# Patient Record
Sex: MALE | Race: WHITE | HISPANIC OR LATINO | Employment: FULL TIME | URBAN - METROPOLITAN AREA
[De-identification: names, ages, dates, MRNs, and addresses within clinical notes are randomized per-mention and may not be internally consistent; named-entity substitution may affect disease eponyms.]

---

## 2023-10-15 ENCOUNTER — HOSPITAL ENCOUNTER (EMERGENCY)
Facility: HOSPITAL | Age: 58
Discharge: HOME | End: 2023-10-15
Attending: EMERGENCY MEDICINE | Admitting: EMERGENCY MEDICINE
Payer: COMMERCIAL

## 2023-10-15 ENCOUNTER — APPOINTMENT (OUTPATIENT)
Dept: GENERAL RADIOLOGY | Facility: HOSPITAL | Age: 58
End: 2023-10-15
Payer: COMMERCIAL

## 2023-10-15 VITALS
BODY MASS INDEX: 31.18 KG/M2 | HEART RATE: 98 BPM | RESPIRATION RATE: 18 BRPM | OXYGEN SATURATION: 98 % | TEMPERATURE: 98.1 F | WEIGHT: 187.17 LBS | SYSTOLIC BLOOD PRESSURE: 168 MMHG | DIASTOLIC BLOOD PRESSURE: 82 MMHG | HEIGHT: 65 IN

## 2023-10-15 DIAGNOSIS — M79.644 FINGER PAIN, RIGHT: ICD-10-CM

## 2023-10-15 DIAGNOSIS — S61.210A LACERATION OF RIGHT INDEX FINGER WITHOUT FOREIGN BODY WITHOUT DAMAGE TO NAIL, INITIAL ENCOUNTER: Primary | ICD-10-CM

## 2023-10-15 PROCEDURE — 73140 X-RAY EXAM OF FINGER(S): CPT

## 2023-10-15 PROCEDURE — 99282 EMERGENCY DEPT VISIT SF MDM: CPT

## 2023-10-15 PROCEDURE — 90471 IMMUNIZATION ADMIN: CPT

## 2023-10-15 PROCEDURE — 25010000002 TETANUS-DIPHTH-ACELL PERTUSSIS 5-2.5-18.5 LF-MCG/0.5 SUSPENSION PREFILLED SYRINGE

## 2023-10-15 PROCEDURE — 25010000002 BUPIVACAINE (PF) 0.5 % SOLUTION

## 2023-10-15 PROCEDURE — 90715 TDAP VACCINE 7 YRS/> IM: CPT

## 2023-10-15 RX ORDER — BUPIVACAINE HYDROCHLORIDE 5 MG/ML
10 INJECTION, SOLUTION EPIDURAL; INTRACAUDAL ONCE
Status: COMPLETED | OUTPATIENT
Start: 2023-10-15 | End: 2023-10-15

## 2023-10-15 RX ADMIN — TETANUS TOXOID, REDUCED DIPHTHERIA TOXOID AND ACELLULAR PERTUSSIS VACCINE, ADSORBED 0.5 ML: 5; 2.5; 8; 8; 2.5 SUSPENSION INTRAMUSCULAR at 14:01

## 2023-10-15 RX ADMIN — BUPIVACAINE HYDROCHLORIDE 10 ML: 5 INJECTION, SOLUTION EPIDURAL; INTRACAUDAL; PERINEURAL at 13:28

## 2023-10-15 RX ADMIN — MUPIROCIN 1 APPLICATION: 20 OINTMENT TOPICAL at 14:46

## 2023-10-15 NOTE — ED PROVIDER NOTES
Subjective   History of Present Illness  58-year-old male presents to the ED with laceration to right index finger onset approximately 2 hours ago.  He states he was at work when he smashed his finger between 2 pieces of wood.  Notes that he is able to still move his finger and has sensation.  Denies any other injury.  Unsure of last tetanus.        Review of Systems   Respiratory:  Negative for chest tightness and shortness of breath.    Cardiovascular:  Negative for chest pain and palpitations.   Skin:  Positive for wound.   All other systems reviewed and are negative.      No past medical history on file.    No Known Allergies    No past surgical history on file.    No family history on file.    Social History     Socioeconomic History    Marital status:            Objective   Physical Exam  Vitals and nursing note reviewed.   Constitutional:       General: He is not in acute distress.     Appearance: Normal appearance. He is not ill-appearing, toxic-appearing or diaphoretic.   HENT:      Head: Normocephalic and atraumatic.      Nose: Nose normal. No congestion or rhinorrhea.      Mouth/Throat:      Pharynx: Oropharynx is clear. No oropharyngeal exudate or posterior oropharyngeal erythema.   Eyes:      General: No scleral icterus.     Extraocular Movements: Extraocular movements intact.      Conjunctiva/sclera: Conjunctivae normal.      Pupils: Pupils are equal, round, and reactive to light.   Cardiovascular:      Rate and Rhythm: Regular rhythm. Tachycardia present.      Heart sounds: Normal heart sounds. No murmur heard.     No friction rub. No gallop.   Pulmonary:      Effort: Pulmonary effort is normal. No respiratory distress.      Breath sounds: Normal breath sounds. No stridor. No wheezing, rhonchi or rales.   Chest:      Chest wall: No tenderness.   Musculoskeletal:      Right hand: Laceration present. Normal strength. Normal capillary refill. Normal pulse.      Left hand: Normal strength. Normal  capillary refill. Normal pulse.        Hands:       Cervical back: Normal range of motion and neck supple.      Comments: Right index finger with linear laceration to dorsolateral aspect,  less than 1cm in length. Skin tear noted to dorsal aspect of R index finger below fingernail. Neurovascularly intact.   Skin:     General: Skin is warm and dry.   Neurological:      General: No focal deficit present.      Mental Status: He is alert and oriented to person, place, and time. Mental status is at baseline.   Psychiatric:         Mood and Affect: Mood normal.         Behavior: Behavior normal.         Thought Content: Thought content normal.         Judgment: Judgment normal.         Laceration Repair    Date/Time: 10/15/2023 2:38 PM    Performed by: Terra Lawrence PA-C  Authorized by: Jin Pineda MD    Consent:     Consent obtained:  Verbal    Consent given by:  Patient    Risks, benefits, and alternatives were discussed: yes      Risks discussed:  Infection, pain and poor wound healing  Universal protocol:     Procedure explained and questions answered to patient or proxy's satisfaction: yes      Relevant documents present and verified: yes      Imaging studies available: yes      Site/side marked: yes      Immediately prior to procedure, a time out was called: yes      Patient identity confirmed:  Verbally with patient, arm band and hospital-assigned identification number  Anesthesia:     Anesthesia method:  Nerve block    Block location:  R index finger digit block    Block needle gauge:  24 G    Block anesthetic:  Bupivacaine 0.5% w/o epi    Block technique:  Digit    Block injection procedure:  Anatomic landmarks identified    Block outcome:  Anesthesia achieved  Laceration details:     Location:  Finger    Finger location:  R index finger    Length (cm):  1  Pre-procedure details:     Preparation:  Patient was prepped and draped in usual sterile fashion  Exploration:     Imaging obtained: x-ray      Imaging  "outcome: foreign body not noted      Wound exploration: entire depth of wound visualized    Treatment:     Area cleansed with:  Julito    Amount of cleaning:  Standard    Irrigation solution:  Sterile saline    Debridement:  None  Skin repair:     Repair method:  Sutures    Suture size:  5-0    Suture material:  Nylon    Number of sutures:  3  Approximation:     Approximation:  Close  Repair type:     Repair type:  Simple  Post-procedure details:     Dressing:  Antibiotic ointment    Procedure completion:  Tolerated well, no immediate complications             ED Course  ED Course as of 10/15/23 1449   Sun Oct 15, 2023   1302 Assumed care   [MV]   1337 Finger x-ray read and interpreted by me and the radiologist as no evidence of acute fracture of the right index finger, no radiopaque foreign body [MV]      ED Course User Index  [MV] Terra Lawrence PA-C                                           Medical Decision Making  Appropriate PPE worn during exam.    BP (!) 182/85 (BP Location: Left arm, Patient Position: Sitting)   Pulse 110   Temp 98.3 °F (36.8 °C) (Oral)   Resp 20   Ht 165.1 cm (65\")   Wt 84.9 kg (187 lb 2.7 oz)   SpO2 96%   BMI 31.15 kg/m²      Co-morbidities --  has no past medical history on file.  Radiology interpretation --  X-rays reviewed by me and interpreted by radiologist:  No radiology results for the last day    Plan -- 58-year-old male presented to the ED with a laceration to right index finger onset approximately 2 hours prior to arrival.  He states he was at work when he smashed his finger between 2 pieces of wood.  Normal range of motion and sensation neurovascular intact.  Less than 1 cm laceration noted to dorsolateral aspect of R index finger. Skin tear also noted to dorsal aspect of R index finger just below the fingernail. Finger x-ray read and interpreted by me and the radiologist as no evidence of acute fracture of the right index finger, no radiopaque foreign body.  " Digit block was performed using 0.5% bupivacaine without epi.  Wound was then cleansed and prepped and draped in a sterile fashion.  Laceration was closed using a 5-0 nylon 3 simple interrupted sutures were placed. Bacatracin was applied.  Patient tolerated the procedure well and was neurovascularly intact postprocedure.  He was then discharged home and advised to follow-up with primary care physician in 10 days for suture removal.  He was provided follow-up for hand should he have continued pain or symptoms.  He was unsure of his tetanus and this was updated today.  Remained stable while in the ED.  Discharged home at this time    I discussed the findings and recommendations with the patient who voices understanding. Stable while in the ER.           Problems Addressed:  Finger pain, right: complicated acute illness or injury  Laceration of right index finger without foreign body without damage to nail, initial encounter: complicated acute illness or injury    Amount and/or Complexity of Data Reviewed  Radiology: ordered.    Risk  Prescription drug management.        Final diagnoses:   Laceration of right index finger without foreign body without damage to nail, initial encounter   Finger pain, right       ED Disposition  ED Disposition       ED Disposition   Discharge    Condition   Stable    Comment   --               Hipolito Parra MD  [address] [facility] [place] [address]  [phone]    Call in 5 days  If symptoms worsen    Primary care physician    In 10 days  For suture removal         Medication List      No changes were made to your prescriptions during this visit.            Terra Lawrence PA-C  10/15/23 8499

## 2023-10-15 NOTE — DISCHARGE INSTRUCTIONS
Take over-the-counter pain medication as needed for pain.  Keep wound clean and dry.    Follow-up with primary care physician to have sutures removed in 10 days.    Return to the ED for any worsening or new symptom development.

## 2025-06-03 ENCOUNTER — LAB (OUTPATIENT)
Dept: LAB | Facility: HOSPITAL | Age: 60
End: 2025-06-03
Payer: COMMERCIAL

## 2025-06-03 ENCOUNTER — TRANSCRIBE ORDERS (OUTPATIENT)
Dept: ADMINISTRATIVE | Facility: HOSPITAL | Age: 60
End: 2025-06-03
Payer: COMMERCIAL

## 2025-06-03 DIAGNOSIS — R53.1 ASTHENIA: ICD-10-CM

## 2025-06-03 DIAGNOSIS — R53.83 TIREDNESS: ICD-10-CM

## 2025-06-03 DIAGNOSIS — F41.1 GENERALIZED ANXIETY DISORDER: ICD-10-CM

## 2025-06-03 DIAGNOSIS — G47.00 PERSISTENT DISORDER OF INITIATING OR MAINTAINING SLEEP: Primary | ICD-10-CM

## 2025-06-03 DIAGNOSIS — Z12.5 SPECIAL SCREENING FOR MALIGNANT NEOPLASM OF PROSTATE: ICD-10-CM

## 2025-06-03 DIAGNOSIS — G47.00 PERSISTENT DISORDER OF INITIATING OR MAINTAINING SLEEP: ICD-10-CM

## 2025-06-03 LAB
25(OH)D3 SERPL-MCNC: 21.4 NG/ML (ref 30–100)
ALBUMIN SERPL-MCNC: 4.1 G/DL (ref 3.5–5.2)
ALBUMIN/GLOB SERPL: 1.3 G/DL
ALP SERPL-CCNC: 94 U/L (ref 39–117)
ALT SERPL W P-5'-P-CCNC: 21 U/L (ref 1–41)
ANION GAP SERPL CALCULATED.3IONS-SCNC: 11.3 MMOL/L (ref 5–15)
AST SERPL-CCNC: 16 U/L (ref 1–40)
BASOPHILS # BLD AUTO: 0.04 10*3/MM3 (ref 0–0.2)
BASOPHILS NFR BLD AUTO: 0.5 % (ref 0–1.5)
BILIRUB SERPL-MCNC: 1.1 MG/DL (ref 0–1.2)
BUN SERPL-MCNC: 19 MG/DL (ref 6–20)
BUN/CREAT SERPL: 17.4 (ref 7–25)
CALCIUM SPEC-SCNC: 9.4 MG/DL (ref 8.6–10.5)
CHLORIDE SERPL-SCNC: 105 MMOL/L (ref 98–107)
CHOLEST SERPL-MCNC: 190 MG/DL (ref 0–200)
CO2 SERPL-SCNC: 24.7 MMOL/L (ref 22–29)
CREAT SERPL-MCNC: 1.09 MG/DL (ref 0.76–1.27)
DEPRECATED RDW RBC AUTO: 40.8 FL (ref 37–54)
EGFRCR SERPLBLD CKD-EPI 2021: 78.2 ML/MIN/1.73
EOSINOPHIL # BLD AUTO: 0.16 10*3/MM3 (ref 0–0.4)
EOSINOPHIL NFR BLD AUTO: 1.9 % (ref 0.3–6.2)
ERYTHROCYTE [DISTWIDTH] IN BLOOD BY AUTOMATED COUNT: 12 % (ref 12.3–15.4)
GLOBULIN UR ELPH-MCNC: 3.2 GM/DL
GLUCOSE SERPL-MCNC: 106 MG/DL (ref 65–99)
HCT VFR BLD AUTO: 50 % (ref 37.5–51)
HDLC SERPL-MCNC: 45 MG/DL (ref 40–60)
HGB BLD-MCNC: 16.7 G/DL (ref 13–17.7)
IMM GRANULOCYTES # BLD AUTO: 0.03 10*3/MM3 (ref 0–0.05)
IMM GRANULOCYTES NFR BLD AUTO: 0.4 % (ref 0–0.5)
LDLC SERPL CALC-MCNC: 114 MG/DL (ref 0–100)
LDLC/HDLC SERPL: 2.44 {RATIO}
LYMPHOCYTES # BLD AUTO: 3.18 10*3/MM3 (ref 0.7–3.1)
LYMPHOCYTES NFR BLD AUTO: 37.2 % (ref 19.6–45.3)
MCH RBC QN AUTO: 31 PG (ref 26.6–33)
MCHC RBC AUTO-ENTMCNC: 33.4 G/DL (ref 31.5–35.7)
MCV RBC AUTO: 92.8 FL (ref 79–97)
MONOCYTES # BLD AUTO: 0.52 10*3/MM3 (ref 0.1–0.9)
MONOCYTES NFR BLD AUTO: 6.1 % (ref 5–12)
NEUTROPHILS NFR BLD AUTO: 4.62 10*3/MM3 (ref 1.7–7)
NEUTROPHILS NFR BLD AUTO: 53.9 % (ref 42.7–76)
NRBC BLD AUTO-RTO: 0 /100 WBC (ref 0–0.2)
PLATELET # BLD AUTO: 262 10*3/MM3 (ref 140–450)
PMV BLD AUTO: 11.3 FL (ref 6–12)
POTASSIUM SERPL-SCNC: 4.5 MMOL/L (ref 3.5–5.2)
PROT SERPL-MCNC: 7.3 G/DL (ref 6–8.5)
PSA SERPL-MCNC: 0.53 NG/ML (ref 0–4)
RBC # BLD AUTO: 5.39 10*6/MM3 (ref 4.14–5.8)
SODIUM SERPL-SCNC: 141 MMOL/L (ref 136–145)
T3 SERPL-MCNC: 116 NG/DL (ref 80–200)
T4 SERPL-MCNC: 8.12 MCG/DL (ref 4.5–11.7)
TRIGL SERPL-MCNC: 177 MG/DL (ref 0–150)
TSH SERPL DL<=0.05 MIU/L-ACNC: 3.38 UIU/ML (ref 0.27–4.2)
VLDLC SERPL-MCNC: 31 MG/DL (ref 5–40)
WBC NRBC COR # BLD AUTO: 8.55 10*3/MM3 (ref 3.4–10.8)

## 2025-06-03 PROCEDURE — 80053 COMPREHEN METABOLIC PANEL: CPT

## 2025-06-03 PROCEDURE — 85025 COMPLETE CBC W/AUTO DIFF WBC: CPT

## 2025-06-03 PROCEDURE — 84436 ASSAY OF TOTAL THYROXINE: CPT

## 2025-06-03 PROCEDURE — 84443 ASSAY THYROID STIM HORMONE: CPT

## 2025-06-03 PROCEDURE — 82306 VITAMIN D 25 HYDROXY: CPT

## 2025-06-03 PROCEDURE — 84480 ASSAY TRIIODOTHYRONINE (T3): CPT

## 2025-06-03 PROCEDURE — G0103 PSA SCREENING: HCPCS

## 2025-06-03 PROCEDURE — 80061 LIPID PANEL: CPT

## 2025-06-03 PROCEDURE — 36415 COLL VENOUS BLD VENIPUNCTURE: CPT
